# Patient Record
Sex: FEMALE | Race: WHITE
[De-identification: names, ages, dates, MRNs, and addresses within clinical notes are randomized per-mention and may not be internally consistent; named-entity substitution may affect disease eponyms.]

---

## 2017-07-29 ENCOUNTER — HEALTH MAINTENANCE LETTER (OUTPATIENT)
Age: 42
End: 2017-07-29

## 2018-08-05 ENCOUNTER — HEALTH MAINTENANCE LETTER (OUTPATIENT)
Age: 43
End: 2018-08-05

## 2021-08-18 ENCOUNTER — OFFICE VISIT (OUTPATIENT)
Dept: CARDIOLOGY | Facility: CLINIC | Age: 46
End: 2021-08-18
Payer: COMMERCIAL

## 2021-08-18 VITALS
DIASTOLIC BLOOD PRESSURE: 58 MMHG | RESPIRATION RATE: 16 BRPM | OXYGEN SATURATION: 99 % | WEIGHT: 170 LBS | SYSTOLIC BLOOD PRESSURE: 100 MMHG | HEIGHT: 67 IN | BODY MASS INDEX: 26.68 KG/M2 | HEART RATE: 76 BPM

## 2021-08-18 DIAGNOSIS — R06.09 DYSPNEA ON EXERTION: Primary | ICD-10-CM

## 2021-08-18 PROCEDURE — 99204 OFFICE O/P NEW MOD 45 MIN: CPT | Performed by: INTERNAL MEDICINE

## 2021-08-18 RX ORDER — ACYCLOVIR 400 MG/1
TABLET ORAL 2 TIMES DAILY PRN
COMMUNITY
Start: 2021-02-19

## 2021-08-18 RX ORDER — PROGESTERONE 200 MG/1
200 CAPSULE ORAL DAILY
COMMUNITY
Start: 2020-05-23

## 2021-08-18 RX ORDER — SUMATRIPTAN 100 MG/1
100 TABLET, FILM COATED ORAL PRN
COMMUNITY
Start: 2020-08-21

## 2021-08-18 RX ORDER — TOPIRAMATE 50 MG/1
50 TABLET, FILM COATED ORAL 2 TIMES DAILY
COMMUNITY
Start: 2021-05-21

## 2021-08-18 RX ORDER — PROPRANOLOL HYDROCHLORIDE 10 MG/1
10 TABLET ORAL PRN
COMMUNITY
Start: 2021-05-21

## 2021-08-18 ASSESSMENT — MIFFLIN-ST. JEOR: SCORE: 1448.74

## 2021-08-18 NOTE — PROGRESS NOTES
Click to link to [facility] NOTE    Thank you,   for asking me to see Swetha Stout in consultation at [facility] to evaluate shortness of breath.      Assessment/Plan:   1.  Shortness of breath on exertion: The patient developed shortness of breath on exertion, sometimes shortness of breath at rest over last 6 months to a year.  Her symptoms has been gradually getting worse.  Her chest pain is not typically associated with exertion.  She has a family history of for coronary artery disease as mentioned below.  She is used cocaine, drink alcohol in the past.  She is still a currently smoker.  She has bilateral breast implant.  She is not a good candidate for exercise a stress echo study.  After discussion, stress cardiac MRI is requested for evaluation of her heart function and structure, also evaluation of myocardial ischemia.  The patient agreed with the plan.    Thank you for the opportunity to be involved in the care of Swetha Stout. If you have any questions, please feel free to contact me.  I will see the patient again as needed.    Much or all of the text in this note was generated through the use of Dragon Dictate voice-to-text software. Errors in spelling or words which seem out of context are unintentional.   Sound alike errors, in particular, may have escaped editing.       History of Present Illness:   It is my pleasure to see Swetha Stout at the [facility] for evaluation of shortness of breath. Swetha Stout is a 46 year old female with a medical history of migraine headaches, anxiety.    The patient states that she developed shortness of breath over last 6 months to a year.  Her symptoms has been gradually getting worse.  She developed shortness of breath on exertion, typically associated with climbing stairs, hills, activities, relieved by rest.  She has occasional chest pain, mild in severity, sharp pain, not  "typically associated with exertion.  She does not know if it is related to breast implant.  She has occasional palpitations.  She has occasional lightheadedness dizziness.  She had no syncope.  She has no orthopnea, PND or leg edema.  Her blood pressure and heart rate are in normal range.    She states that she used cocaine for many years.  She quit cocaine use many years ago.  She also was a drinker, quitted drinking many years ago.  She has a long history of smoking, still smokes several cigarettes a day.    Past Medical History:   Anxiety  Migraine headaches    Past Surgical History:   Bilateral breast implant surgery.      Family History:   Reviewed: Her father has CAD with stent placement at age of 60.    Social History:   Reviewed: She has a medical history of fall cocaine abuse, long history of for smoking still current smoker, quit drinking many years ago.    Review of Systems:   12 systems are reviewed negative except for in HPI.    Meds:     Current Outpatient Medications:      acyclovir (ZOVIRAX) 400 MG tablet, Take by mouth 2 times daily as needed, Disp: , Rfl:      cholecalciferol 50 MCG ( [place]) CAPS, Take 1-2 tablets by mouth daily, Disp: , Rfl:      progesterone (PROMETRIUM) 200 MG capsule, Take 200 mg by mouth daily, Disp: , Rfl:      propranolol (INDERAL) 10 MG tablet, Take 10 mg by mouth as needed, Disp: , Rfl:      SUMAtriptan (IMITREX) 100 MG tablet, Take 100 mg by mouth as needed, Disp: , Rfl:      topiramate (TOPAMAX) 50 MG tablet, Take 50 mg by mouth 2 times daily, Disp: , Rfl:      Allergies:   Amoxicillin    Objective:      Physical Exam  77.1 kg (170 lb)  1.702 m (5' 7\")  Body mass index is 26.63 kg/m .  /58 (BP Location: Left arm, Patient Position: Sitting, Cuff Size: Adult Regular)   Pulse 76   Resp 16   Ht 1.702 m (5' 7\")   Wt 77.1 kg (170 lb)   SpO2 99%   BMI 26.63 kg/m      General Appearance:   Awake, Alert, No acute distress.   HEENT:  Pupil equal, reactive to " light. No scleral icterus; the mucous membranes were moist. No oral ulcers or thrush.    Neck: No cervical bruits. No JVD. No thyromegaly. No lymph node enlargement or tenderness.   Chest: The spine was straight. The chest was symmetric.   Lungs:   Respirations unlabored. Lungs are clear to auscultation. No crackles. No wheezing.   Cardiovascular:   RRR, normal first and second heart sounds with no murmurs. No rubs or gallops.    Abdomen:  Soft. No tenderness. Non-distended. Bowels sounds are present   Extremities: Equal posterior tibial pulses. No leg edema.   Skin: No rashes or ulcers. Warm, Dry.   Musculoskeletal: No tenderness. No deformity.   Neurologic: Mood and affect are appropriate. No focal deficits.         Lab Review   Lab reports from outside medical system are reviewed, normal renal function and electrolytes.

## 2021-08-18 NOTE — LETTER
8/18/2021    Physician No Ref-Primary  No address on file    RE: Swetha Stout       Dear Colleague,    I had the pleasure of seeing Swetha Stout in the [facility].        Click to link to [facility] C[facility] NOTE    Thank you,   for asking me to see Swetha Stout in consultation at [facility] to evaluate shortness of breath.      Assessment/Plan:   1.  Shortness of breath on exertion: The patient developed shortness of breath on exertion, sometimes shortness of breath at rest over last 6 months to a year.  Her symptoms has been gradually getting worse.  Her chest pain is not typically associated with exertion.  She has a family history of for coronary artery disease as mentioned below.  She is used cocaine, drink alcohol in the past.  She is still a currently smoker.  She has bilateral breast implant.  She is not a good candidate for exercise a stress echo study.  After discussion, stress cardiac MRI is requested for evaluation of her heart function and structure, also evaluation of myocardial ischemia.  The patient agreed with the plan.    Thank you for the opportunity to be involved in the care of Swetha Stout. If you have any questions, please feel free to contact me.  I will see the patient again as needed.    Much or all of the text in this note was generated through the use of Dragon Dictate voice-to-text software. Errors in spelling or words which seem out of context are unintentional.   Sound alike errors, in particular, may have escaped editing.       History of Present Illness:   It is my pleasure to see Swetha Stout at the [facility] for evaluation of shortness of breath. Swetha Stout is a 46 year old female with a medical history of migraine headaches, anxiety.    The patient states that she developed shortness of breath over last 6 months to a year.  Her symptoms  "has been gradually getting worse.  She developed shortness of breath on exertion, typically associated with climbing stairs, hills, activities, relieved by rest.  She has occasional chest pain, mild in severity, sharp pain, not typically associated with exertion.  She does not know if it is related to breast implant.  She has occasional palpitations.  She has occasional lightheadedness dizziness.  She had no syncope.  She has no orthopnea, PND or leg edema.  Her blood pressure and heart rate are in normal range.    She states that she used cocaine for many years.  She quit cocaine use many years ago.  She also was a drinker, quitted drinking many years ago.  She has a long history of smoking, still smokes several cigarettes a day.    Past Medical History:   Anxiety  Migraine headaches    Past Surgical History:   Bilateral breast implant surgery.      Family History:   Reviewed: Her father has CAD with stent placement at age of 60.    Social History:   Reviewed: She has a medical history of fall cocaine abuse, long history of for smoking still current smoker, quit drinking many years ago.    Review of Systems:   12 systems are reviewed negative except for in HPI.    Meds:     Current Outpatient Medications:      acyclovir (ZOVIRAX) 400 MG tablet, Take by mouth 2 times daily as needed, Disp: , Rfl:      cholecalciferol 50 MCG ( [place]) CAPS, Take 1-2 tablets by mouth daily, Disp: , Rfl:      progesterone (PROMETRIUM) 200 MG capsule, Take 200 mg by mouth daily, Disp: , Rfl:      propranolol (INDERAL) 10 MG tablet, Take 10 mg by mouth as needed, Disp: , Rfl:      SUMAtriptan (IMITREX) 100 MG tablet, Take 100 mg by mouth as needed, Disp: , Rfl:      topiramate (TOPAMAX) 50 MG tablet, Take 50 mg by mouth 2 times daily, Disp: , Rfl:      Allergies:   Amoxicillin    Objective:      Physical Exam  77.1 kg (170 lb)  1.702 m (5' 7\")  Body mass index is 26.63 kg/m .  /58 (BP Location: Left arm, Patient Position: " "Sitting, Cuff Size: Adult Regular)   Pulse 76   Resp 16   Ht 1.702 m (5' 7\")   Wt 77.1 kg (170 lb)   SpO2 99%   BMI 26.63 kg/m      General Appearance:   Awake, Alert, No acute distress.   HEENT:  Pupil equal, reactive to light. No scleral icterus; the mucous membranes were moist. No oral ulcers or thrush.    Neck: No cervical bruits. No JVD. No thyromegaly. No lymph node enlargement or tenderness.   Chest: The spine was straight. The chest was symmetric.   Lungs:   Respirations unlabored. Lungs are clear to auscultation. No crackles. No wheezing.   Cardiovascular:   RRR, normal first and second heart sounds with no murmurs. No rubs or gallops.    Abdomen:  Soft. No tenderness. Non-distended. Bowels sounds are present   Extremities: Equal posterior tibial pulses. No leg edema.   Skin: No rashes or ulcers. Warm, Dry.   Musculoskeletal: No tenderness. No deformity.   Neurologic: Mood and affect are appropriate. No focal deficits.         Lab Review   Lab reports from outside medical system are reviewed, normal renal function and electrolytes.              Thank you for allowing me to participate in the care of your patient.      Sincerely,     Beth Kimbrough MD     [facility]  cc:   No referring provider defined for this encounter.        "

## 2021-09-14 ENCOUNTER — HOSPITAL ENCOUNTER (OUTPATIENT)
Dept: MRI IMAGING | Facility: HOSPITAL | Age: 46
End: 2021-09-14
Attending: INTERNAL MEDICINE
Payer: COMMERCIAL

## 2021-09-14 VITALS — OXYGEN SATURATION: 100 % | DIASTOLIC BLOOD PRESSURE: 55 MMHG | HEART RATE: 74 BPM | SYSTOLIC BLOOD PRESSURE: 101 MMHG

## 2021-09-14 DIAGNOSIS — R06.09 DYSPNEA ON EXERTION: ICD-10-CM

## 2021-09-14 LAB
ATRIAL RATE - MUSE: 67 BPM
ATRIAL RATE - MUSE: 68 BPM
DIASTOLIC BLOOD PRESSURE - MUSE: NORMAL MMHG
DIASTOLIC BLOOD PRESSURE - MUSE: NORMAL MMHG
INTERPRETATION ECG - MUSE: NORMAL
INTERPRETATION ECG - MUSE: NORMAL
P AXIS - MUSE: 69 DEGREES
P AXIS - MUSE: 76 DEGREES
PR INTERVAL - MUSE: 162 MS
PR INTERVAL - MUSE: 162 MS
QRS DURATION - MUSE: 70 MS
QRS DURATION - MUSE: 72 MS
QT - MUSE: 406 MS
QT - MUSE: 420 MS
QTC - MUSE: 429 MS
QTC - MUSE: 446 MS
R AXIS - MUSE: 69 DEGREES
R AXIS - MUSE: 78 DEGREES
SYSTOLIC BLOOD PRESSURE - MUSE: NORMAL MMHG
SYSTOLIC BLOOD PRESSURE - MUSE: NORMAL MMHG
T AXIS - MUSE: 39 DEGREES
T AXIS - MUSE: 57 DEGREES
VENTRICULAR RATE- MUSE: 67 BPM
VENTRICULAR RATE- MUSE: 68 BPM

## 2021-09-14 PROCEDURE — 999N000122 MR MYOCARDIUM  OVERREAD

## 2021-09-14 PROCEDURE — 93016 CV STRESS TEST SUPVJ ONLY: CPT

## 2021-09-14 PROCEDURE — 255N000002 HC RX 255 OP 636: Performed by: INTERNAL MEDICINE

## 2021-09-14 PROCEDURE — A9585 GADOBUTROL INJECTION: HCPCS | Performed by: INTERNAL MEDICINE

## 2021-09-14 PROCEDURE — 93018 CV STRESS TEST I&R ONLY: CPT

## 2021-09-14 PROCEDURE — 75563 CARD MRI W/STRESS IMG & DYE: CPT | Mod: 26 | Performed by: INTERNAL MEDICINE

## 2021-09-14 PROCEDURE — 93005 ELECTROCARDIOGRAM TRACING: CPT

## 2021-09-14 PROCEDURE — 93016 CV STRESS TEST SUPVJ ONLY: CPT | Performed by: INTERNAL MEDICINE

## 2021-09-14 PROCEDURE — 250N000011 HC RX IP 250 OP 636: Performed by: INTERNAL MEDICINE

## 2021-09-14 PROCEDURE — 75563 CARD MRI W/STRESS IMG & DYE: CPT

## 2021-09-14 PROCEDURE — 93018 CV STRESS TEST I&R ONLY: CPT | Performed by: INTERNAL MEDICINE

## 2021-09-14 PROCEDURE — 93010 ELECTROCARDIOGRAM REPORT: CPT | Performed by: INTERNAL MEDICINE

## 2021-09-14 RX ORDER — ALBUTEROL SULFATE 0.83 MG/ML
2.5 SOLUTION RESPIRATORY (INHALATION)
Status: DISCONTINUED | OUTPATIENT
Start: 2021-09-14 | End: 2021-09-14 | Stop reason: HOSPADM

## 2021-09-14 RX ORDER — GADOBUTROL 604.72 MG/ML
18 INJECTION INTRAVENOUS ONCE
Status: COMPLETED | OUTPATIENT
Start: 2021-09-14 | End: 2021-09-14

## 2021-09-14 RX ORDER — CAFFEINE CITRATE 20 MG/ML
60 SOLUTION INTRAVENOUS
Status: DISCONTINUED | OUTPATIENT
Start: 2021-09-14 | End: 2021-09-14 | Stop reason: HOSPADM

## 2021-09-14 RX ORDER — AMINOPHYLLINE 25 MG/ML
50 INJECTION, SOLUTION INTRAVENOUS
Status: DISCONTINUED | OUTPATIENT
Start: 2021-09-14 | End: 2021-09-14 | Stop reason: HOSPADM

## 2021-09-14 RX ORDER — CAFFEINE 200 MG
200 TABLET ORAL
Status: DISCONTINUED | OUTPATIENT
Start: 2021-09-14 | End: 2021-09-14 | Stop reason: HOSPADM

## 2021-09-14 RX ORDER — REGADENOSON 0.08 MG/ML
0.4 INJECTION, SOLUTION INTRAVENOUS ONCE
Status: COMPLETED | OUTPATIENT
Start: 2021-09-14 | End: 2021-09-14

## 2021-09-14 RX ADMIN — REGADENOSON 0.4 MG: 0.08 INJECTION, SOLUTION INTRAVENOUS at 11:38

## 2021-09-14 RX ADMIN — GADOBUTROL 18 ML: 604.72 INJECTION INTRAVENOUS at 12:26

## 2024-06-22 ENCOUNTER — HEALTH MAINTENANCE LETTER (OUTPATIENT)
Age: 49
End: 2024-06-22

## 2025-07-12 ENCOUNTER — HEALTH MAINTENANCE LETTER (OUTPATIENT)
Age: 50
End: 2025-07-12